# Patient Record
(demographics unavailable — no encounter records)

---

## 2024-10-09 NOTE — REVIEW OF SYSTEMS
[TextEntry] : Review of systems positive for: wearing contacts  Review of systems negative for weight loss, weight gain, fatigue, appetite changes, difficulty with sleep, cold sensitivity, increased thirst, increased urination, waking at night to urinate, unexplained fevers, headaches, loss of consciousness, seizures, blurred vision, double vision, wears glasses/contacts, hearing problems, low or no sense of smell, heart murmur, palpitations, snoring, shortness of breath, abdominal pain, nausea/vomiting, constipation, joint pain, muscle pain, hair loss, rashes, dry skin, acne, easy bruising, anxiety, depression, behavioral concerns, pubic hair before 9 years, body odor before 9 years, breast development

## 2024-10-09 NOTE — SIGNATURES
[TextEntry] : Amy Quinonez MD Pediatric Endocrinologist Division of Pediatric Endocrinology  [facility] at [facility]

## 2024-10-09 NOTE — CONSULT LETTER
[Dear  ___] : Dear  [unfilled], [Courtesy Letter:] : I had the pleasure of seeing your patient, [unfilled], in my office today. [Please see my note below.] : Please see my note below. [Consult Closing:] : Thank you very much for allowing me to participate in the care of this patient.  If you have any questions, please do not hesitate to contact me. [Sincerely,] : Sincerely, [FreeTextEntry3] : Amy Quinonez MD Pediatric Endocrinologist Division of Pediatric Endocrinology  [facility] at [facility]

## 2024-10-09 NOTE — PHYSICAL EXAM
[Healthy Appearing] : healthy appearing [Well Nourished] : well nourished [Interactive] : interactive [Acanthosis Nigricans___] : no acanthosis nigricans [Normal Appearance] : normal appearance [Well formed] : well formed [Normally Set] : normally set [6 yr Molar Erupted] : 6 year molars have erupted [12 yr Molar Eruption] : 12 year molars have erupted [Goiter] : no goiter [None] : there were no thyroid nodules [Normal S1 and S2] : normal S1 and S2 [Murmur] : no murmurs [Clear to Ausculation Bilaterally] : clear to auscultation bilaterally [Abdomen Soft] : soft [Abdomen Tenderness] : non-tender [] : no hepatosplenomegaly [2] : was Robbi stage 2 [Scant] : scant [___] : [unfilled]  [Scoliosis] : scoliosis not appreciated [Normal] : normal  [de-identified] : No hyperpigmentation noted [de-identified] : PERRL, sclera clear [FreeTextEntry1] : 1-2 hairs on each side

## 2024-10-09 NOTE — FAMILY HISTORY
[TextEntry] : Father: [id number]; not sure about puberty timing; no PMHx Mother: 5ft6in; menarche at 17yo delayed puberty; no PMHx Brother (Solitario): 21yo, 6ft; delayed puberty Sister (Kendy): 19yo, 5ft5/6in; menarche at 17yo; no PMHx MPH 71in +/- 4in  Delayed puberty: mom, brother, and sister Cancer: lung cancer in [facility]  in 60s, [facility] colon cancer and  in 80s   There is no known past medical history of diabetes, thyroid disease, early puberty, short stature, disorders of calcium, other hormone conditions, obesity, high cholesterol, hypertension, bone disease, liver disease, kidney disease, lung disease, heart disease, neurological disease (including stroke), infertility, birth defects, PCOS, celiac disease, pituitary disease, NAFLD, weight loss surgery, lupus, RA, Crohn's disease/Ulcerative colitis, MI, CANDICE, blood clots, adrenal problems, osteoporosis, frequent fractures.

## 2024-10-09 NOTE — ASSESSMENT
[FreeTextEntry1] : Checo is a 14y4m adolescent young man here today for an initial visit with pediatric endocrinology for delayed puberty. there is a strong family history of delayed puberty in his older siblings and mother. I reviewed his vitals and growth parameters with him and his mom today. I also discussed his pubertal exam which shows he may be in early puberty. I discussed the physiology of puberty and the difference between central and adrenal puberty. I also reviewed starting puberty after 15yo is delayed.   - Will check 8am esoterix FSH, LH, and testosterone. - Mom is interested in pubertal induction for Checo like Solitario norman. Briefly reviewed that it is testosterone monthly for 3 months to jump start puberty.  - Will also check a bone age.   - Provided PES handout on delayed puberty.  - Will determine f/u timing once labs result.

## 2024-10-09 NOTE — SOCIAL HISTORY
[TextEntry] : - Lives with mom, dad, and sister - Brother lives in [place] - They have a dog and fish

## 2024-10-09 NOTE — HISTORY OF PRESENT ILLNESS
[FreeTextEntry2] : Checo is a 14y4m male here today for an initial visit with pediatric endocrinology for concerns of delayed puberty. He was referred by his pediatrician Dr. Rashad Escobar. He was last seen for a well child check on 11/28/2023 and at that visit he was 5ft1.5in (156.21cm) 31.8% and 86 lb (39.009 kg) 12%. Pubertal exam at that time was Robbi I.  Mom reports that they are here today because his two older siblings were delayed in puberty. His older brother saw a pediatric endocrinologist in [place] who gave him testosterone injections. Mom also had menarche at 17yo. Mom is not aware of dad or dad's family pubertal timing.   He gets some body odor  after sports. He wears deodorant sometimes. He started in the last year. He reports some axillary and pubic hair in the last year. Mom denies. Mom feels like he had some of a growth spurt in the summer.  No voice changes.   He had teeth at 1 year of age. He lost teeth all at the same time, normal age.   He is active- does lacrosse and football.  He is able to keep up with other kids on the field. He has a good appetite and eats a variety of foods. He likes crumbl cookies. He sleeps well. He gets up early for school, but can sleep in on weekends. Normal urination and bowel movements. No headaches, abdominal pain. He has a normal sense of smell. Wears contacts and can see normally with contacts.   No MSK or joint pains. No unusual mood changes. No unusual changes in skin color or salt craving. No lightheadedness or dizziness.  Immunizations: - UTD  Hospitalizations: - None  Fractures or Concussions: - None  Labs Collected 1/24/2024: - UA unremarkable - Normal lipid panel - Normal CMP - CBC unremarkable   Growth Chart: - None available